# Patient Record
Sex: FEMALE | Race: BLACK OR AFRICAN AMERICAN | Employment: UNEMPLOYED | URBAN - METROPOLITAN AREA
[De-identification: names, ages, dates, MRNs, and addresses within clinical notes are randomized per-mention and may not be internally consistent; named-entity substitution may affect disease eponyms.]

---

## 2018-04-02 ENCOUNTER — HOSPITAL ENCOUNTER (INPATIENT)
Age: 58
LOS: 4 days | Discharge: HOME | DRG: 885 | End: 2018-04-06
Attending: EMERGENCY MEDICINE | Admitting: PSYCHIATRY & NEUROLOGY
Payer: COMMERCIAL

## 2018-04-02 DIAGNOSIS — R45.851 SUICIDAL IDEATION: ICD-10-CM

## 2018-04-02 DIAGNOSIS — F22 PARANOIA (HCC): ICD-10-CM

## 2018-04-02 DIAGNOSIS — R45.850 HOMICIDAL IDEATION: Primary | ICD-10-CM

## 2018-04-02 PROBLEM — F32.9 MAJOR DEPRESSIVE EPISODE: Status: ACTIVE | Noted: 2018-04-02

## 2018-04-02 LAB
ACETAMINOPHEN LEVEL: <15 MCG/ML (ref 10–30)
ALBUMIN SERPL-MCNC: 3.8 G/DL (ref 3.5–5.2)
ALP BLD-CCNC: 65 U/L (ref 35–104)
ALT SERPL-CCNC: 17 U/L (ref 0–32)
AMPHETAMINE SCREEN, URINE: NOT DETECTED
ANION GAP SERPL CALCULATED.3IONS-SCNC: 11 MMOL/L (ref 7–16)
AST SERPL-CCNC: 25 U/L (ref 0–31)
BACTERIA: ABNORMAL /HPF
BARBITURATE SCREEN URINE: NOT DETECTED
BASOPHILS ABSOLUTE: 0.03 E9/L (ref 0–0.2)
BASOPHILS RELATIVE PERCENT: 0.8 % (ref 0–2)
BENZODIAZEPINE SCREEN, URINE: NOT DETECTED
BILIRUB SERPL-MCNC: 0.3 MG/DL (ref 0–1.2)
BILIRUBIN URINE: NEGATIVE
BLOOD, URINE: NEGATIVE
BUN BLDV-MCNC: 6 MG/DL (ref 6–20)
CALCIUM SERPL-MCNC: 9 MG/DL (ref 8.6–10.2)
CANNABINOID SCREEN URINE: POSITIVE
CHLORIDE BLD-SCNC: 105 MMOL/L (ref 98–107)
CLARITY: CLEAR
CO2: 27 MMOL/L (ref 22–29)
COCAINE METABOLITE SCREEN URINE: POSITIVE
COLOR: YELLOW
CREAT SERPL-MCNC: 1 MG/DL (ref 0.5–1)
EKG ATRIAL RATE: 63 BPM
EKG P AXIS: 78 DEGREES
EKG P-R INTERVAL: 148 MS
EKG Q-T INTERVAL: 398 MS
EKG QRS DURATION: 84 MS
EKG QTC CALCULATION (BAZETT): 407 MS
EKG R AXIS: 60 DEGREES
EKG T AXIS: 50 DEGREES
EKG VENTRICULAR RATE: 63 BPM
EOSINOPHILS ABSOLUTE: 0.33 E9/L (ref 0.05–0.5)
EOSINOPHILS RELATIVE PERCENT: 8.3 % (ref 0–6)
EPITHELIAL CELLS, UA: ABNORMAL /HPF
ETHANOL: <10 MG/DL (ref 0–0.08)
GFR AFRICAN AMERICAN: >60
GFR NON-AFRICAN AMERICAN: >60 ML/MIN/1.73
GLUCOSE BLD-MCNC: 135 MG/DL (ref 74–109)
GLUCOSE URINE: NEGATIVE MG/DL
HCT VFR BLD CALC: 34.3 % (ref 34–48)
HEMOGLOBIN: 10.2 G/DL (ref 11.5–15.5)
IMMATURE GRANULOCYTES #: 0.01 E9/L
IMMATURE GRANULOCYTES %: 0.3 % (ref 0–5)
KETONES, URINE: NEGATIVE MG/DL
LEUKOCYTE ESTERASE, URINE: NEGATIVE
LYMPHOCYTES ABSOLUTE: 1.43 E9/L (ref 1.5–4)
LYMPHOCYTES RELATIVE PERCENT: 35.9 % (ref 20–42)
MCH RBC QN AUTO: 28.1 PG (ref 26–35)
MCHC RBC AUTO-ENTMCNC: 29.7 % (ref 32–34.5)
MCV RBC AUTO: 94.5 FL (ref 80–99.9)
METHADONE SCREEN, URINE: NOT DETECTED
MONOCYTES ABSOLUTE: 0.37 E9/L (ref 0.1–0.95)
MONOCYTES RELATIVE PERCENT: 9.3 % (ref 2–12)
NEUTROPHILS ABSOLUTE: 1.81 E9/L (ref 1.8–7.3)
NEUTROPHILS RELATIVE PERCENT: 45.4 % (ref 43–80)
NITRITE, URINE: NEGATIVE
OPIATE SCREEN URINE: NOT DETECTED
PDW BLD-RTO: 13.3 FL (ref 11.5–15)
PH UA: 8.5 (ref 5–9)
PHENCYCLIDINE SCREEN URINE: NOT DETECTED
PLATELET # BLD: 185 E9/L (ref 130–450)
PMV BLD AUTO: 11.4 FL (ref 7–12)
POTASSIUM SERPL-SCNC: 3.9 MMOL/L (ref 3.5–5)
PROPOXYPHENE SCREEN: NOT DETECTED
PROTEIN UA: ABNORMAL MG/DL
RBC # BLD: 3.63 E12/L (ref 3.5–5.5)
RBC UA: ABNORMAL /HPF (ref 0–2)
SALICYLATE, SERUM: <0.3 MG/DL (ref 0–30)
SODIUM BLD-SCNC: 143 MMOL/L (ref 132–146)
SPECIFIC GRAVITY UA: 1.01 (ref 1–1.03)
TOTAL PROTEIN: 6.1 G/DL (ref 6.4–8.3)
TRICYCLIC ANTIDEPRESSANTS SCREEN SERUM: NEGATIVE NG/ML
TSH SERPL DL<=0.05 MIU/L-ACNC: 0.91 UIU/ML (ref 0.27–4.2)
UROBILINOGEN, URINE: 4 E.U./DL
WBC # BLD: 4 E9/L (ref 4.5–11.5)
WBC UA: ABNORMAL /HPF (ref 0–5)

## 2018-04-02 PROCEDURE — 93005 ELECTROCARDIOGRAM TRACING: CPT

## 2018-04-02 PROCEDURE — 99285 EMERGENCY DEPT VISIT HI MDM: CPT

## 2018-04-02 PROCEDURE — G0480 DRUG TEST DEF 1-7 CLASSES: HCPCS

## 2018-04-02 PROCEDURE — 6360000002 HC RX W HCPCS: Performed by: EMERGENCY MEDICINE

## 2018-04-02 PROCEDURE — 84443 ASSAY THYROID STIM HORMONE: CPT

## 2018-04-02 PROCEDURE — 81001 URINALYSIS AUTO W/SCOPE: CPT

## 2018-04-02 PROCEDURE — 2500000003 HC RX 250 WO HCPCS: Performed by: EMERGENCY MEDICINE

## 2018-04-02 PROCEDURE — 80053 COMPREHEN METABOLIC PANEL: CPT

## 2018-04-02 PROCEDURE — 93005 ELECTROCARDIOGRAM TRACING: CPT | Performed by: EMERGENCY MEDICINE

## 2018-04-02 PROCEDURE — 2580000003 HC RX 258

## 2018-04-02 PROCEDURE — 80307 DRUG TEST PRSMV CHEM ANLYZR: CPT

## 2018-04-02 PROCEDURE — 2580000003 HC RX 258: Performed by: EMERGENCY MEDICINE

## 2018-04-02 PROCEDURE — 36415 COLL VENOUS BLD VENIPUNCTURE: CPT

## 2018-04-02 PROCEDURE — 85025 COMPLETE CBC W/AUTO DIFF WBC: CPT

## 2018-04-02 PROCEDURE — 1240000000 HC EMOTIONAL WELLNESS R&B

## 2018-04-02 RX ADMIN — FOLIC ACID: 5 INJECTION, SOLUTION INTRAMUSCULAR; INTRAVENOUS; SUBCUTANEOUS at 16:58

## 2018-04-02 ASSESSMENT — PAIN DESCRIPTION - PAIN TYPE: TYPE: CHRONIC PAIN

## 2018-04-02 ASSESSMENT — PAIN DESCRIPTION - LOCATION: LOCATION: ARM

## 2018-04-02 ASSESSMENT — PAIN DESCRIPTION - ORIENTATION: ORIENTATION: LEFT

## 2018-04-03 ENCOUNTER — APPOINTMENT (OUTPATIENT)
Dept: CT IMAGING | Age: 58
DRG: 885 | End: 2018-04-03
Payer: COMMERCIAL

## 2018-04-03 PROCEDURE — 1240000000 HC EMOTIONAL WELLNESS R&B

## 2018-04-03 PROCEDURE — 99221 1ST HOSP IP/OBS SF/LOW 40: CPT | Performed by: PSYCHIATRY & NEUROLOGY

## 2018-04-03 PROCEDURE — 70450 CT HEAD/BRAIN W/O DYE: CPT

## 2018-04-03 PROCEDURE — 6360000002 HC RX W HCPCS: Performed by: PSYCHIATRY & NEUROLOGY

## 2018-04-03 PROCEDURE — 6370000000 HC RX 637 (ALT 250 FOR IP): Performed by: PSYCHIATRY & NEUROLOGY

## 2018-04-03 RX ORDER — QUETIAPINE FUMARATE 200 MG/1
200 TABLET, FILM COATED ORAL NIGHTLY
Status: DISCONTINUED | OUTPATIENT
Start: 2018-04-03 | End: 2018-04-06 | Stop reason: HOSPADM

## 2018-04-03 RX ORDER — LORAZEPAM 1 MG/1
4 TABLET ORAL
Status: DISCONTINUED | OUTPATIENT
Start: 2018-04-03 | End: 2018-04-06

## 2018-04-03 RX ORDER — SODIUM CHLORIDE 0.9 % (FLUSH) 0.9 %
10 SYRINGE (ML) INJECTION PRN
Status: DISCONTINUED | OUTPATIENT
Start: 2018-04-03 | End: 2018-04-03 | Stop reason: SDUPTHER

## 2018-04-03 RX ORDER — LORAZEPAM 0.5 MG/1
0.5 TABLET ORAL EVERY 6 HOURS PRN
Status: DISCONTINUED | OUTPATIENT
Start: 2018-04-03 | End: 2018-04-03

## 2018-04-03 RX ORDER — SODIUM CHLORIDE 0.9 % (FLUSH) 0.9 %
10 SYRINGE (ML) INJECTION EVERY 12 HOURS SCHEDULED
Status: DISCONTINUED | OUTPATIENT
Start: 2018-04-03 | End: 2018-04-03 | Stop reason: SDUPTHER

## 2018-04-03 RX ORDER — ONDANSETRON 2 MG/ML
4 INJECTION INTRAMUSCULAR; INTRAVENOUS EVERY 6 HOURS PRN
Status: DISCONTINUED | OUTPATIENT
Start: 2018-04-03 | End: 2018-04-03

## 2018-04-03 RX ORDER — TRAZODONE HYDROCHLORIDE 50 MG/1
50 TABLET ORAL NIGHTLY PRN
Status: DISCONTINUED | OUTPATIENT
Start: 2018-04-03 | End: 2018-04-06 | Stop reason: HOSPADM

## 2018-04-03 RX ORDER — LORAZEPAM 2 MG/ML
1 INJECTION INTRAMUSCULAR
Status: DISCONTINUED | OUTPATIENT
Start: 2018-04-03 | End: 2018-04-06

## 2018-04-03 RX ORDER — ONDANSETRON 2 MG/ML
4 INJECTION INTRAMUSCULAR; INTRAVENOUS EVERY 6 HOURS PRN
Status: DISCONTINUED | OUTPATIENT
Start: 2018-04-03 | End: 2018-04-03 | Stop reason: DRUGHIGH

## 2018-04-03 RX ORDER — FLUOXETINE HYDROCHLORIDE 20 MG/1
20 CAPSULE ORAL DAILY
Status: DISCONTINUED | OUTPATIENT
Start: 2018-04-03 | End: 2018-04-06 | Stop reason: HOSPADM

## 2018-04-03 RX ORDER — DOCUSATE SODIUM 100 MG/1
100 CAPSULE, LIQUID FILLED ORAL 2 TIMES DAILY PRN
Status: DISCONTINUED | OUTPATIENT
Start: 2018-04-03 | End: 2018-04-06 | Stop reason: HOSPADM

## 2018-04-03 RX ORDER — LORAZEPAM 1 MG/1
1 TABLET ORAL
Status: DISCONTINUED | OUTPATIENT
Start: 2018-04-03 | End: 2018-04-06

## 2018-04-03 RX ORDER — LORAZEPAM 2 MG/ML
3 INJECTION INTRAMUSCULAR
Status: DISCONTINUED | OUTPATIENT
Start: 2018-04-03 | End: 2018-04-06

## 2018-04-03 RX ORDER — SODIUM CHLORIDE 0.9 % (FLUSH) 0.9 %
10 SYRINGE (ML) INJECTION PRN
Status: DISCONTINUED | OUTPATIENT
Start: 2018-04-03 | End: 2018-04-03

## 2018-04-03 RX ORDER — ONDANSETRON 4 MG/1
4 TABLET, ORALLY DISINTEGRATING ORAL EVERY 8 HOURS PRN
Status: DISCONTINUED | OUTPATIENT
Start: 2018-04-03 | End: 2018-04-06 | Stop reason: HOSPADM

## 2018-04-03 RX ORDER — LORAZEPAM 2 MG/ML
2 INJECTION INTRAMUSCULAR
Status: DISCONTINUED | OUTPATIENT
Start: 2018-04-03 | End: 2018-04-06

## 2018-04-03 RX ORDER — SODIUM CHLORIDE 0.9 % (FLUSH) 0.9 %
10 SYRINGE (ML) INJECTION EVERY 12 HOURS SCHEDULED
Status: DISCONTINUED | OUTPATIENT
Start: 2018-04-03 | End: 2018-04-03

## 2018-04-03 RX ORDER — ACETAMINOPHEN 325 MG/1
650 TABLET ORAL EVERY 4 HOURS PRN
Status: DISCONTINUED | OUTPATIENT
Start: 2018-04-03 | End: 2018-04-06 | Stop reason: HOSPADM

## 2018-04-03 RX ORDER — LORAZEPAM 1 MG/1
2 TABLET ORAL
Status: DISCONTINUED | OUTPATIENT
Start: 2018-04-03 | End: 2018-04-06

## 2018-04-03 RX ORDER — LORAZEPAM 2 MG/ML
4 INJECTION INTRAMUSCULAR
Status: DISCONTINUED | OUTPATIENT
Start: 2018-04-03 | End: 2018-04-06

## 2018-04-03 RX ORDER — LORAZEPAM 1 MG/1
3 TABLET ORAL
Status: DISCONTINUED | OUTPATIENT
Start: 2018-04-03 | End: 2018-04-06

## 2018-04-03 RX ORDER — NICOTINE 21 MG/24HR
1 PATCH, TRANSDERMAL 24 HOURS TRANSDERMAL DAILY
Status: DISCONTINUED | OUTPATIENT
Start: 2018-04-03 | End: 2018-04-06 | Stop reason: HOSPADM

## 2018-04-03 RX ADMIN — QUETIAPINE FUMARATE 200 MG: 100 TABLET ORAL at 21:32

## 2018-04-03 RX ADMIN — FLUOXETINE HYDROCHLORIDE 20 MG: 20 CAPSULE ORAL at 10:00

## 2018-04-03 RX ADMIN — LORAZEPAM 0.5 MG: 0.5 TABLET ORAL at 03:10

## 2018-04-03 RX ADMIN — LORAZEPAM 2 MG: 1 TABLET ORAL at 12:29

## 2018-04-03 RX ADMIN — ACETAMINOPHEN 650 MG: 325 TABLET, FILM COATED ORAL at 03:10

## 2018-04-03 RX ADMIN — LORAZEPAM 1 MG: 1 TABLET ORAL at 21:37

## 2018-04-03 RX ADMIN — ONDANSETRON 4 MG: 4 TABLET, ORALLY DISINTEGRATING ORAL at 10:00

## 2018-04-03 ASSESSMENT — SLEEP AND FATIGUE QUESTIONNAIRES
SLEEP PATTERN: DISTURBED/INTERRUPTED SLEEP;DIFFICULTY FALLING ASLEEP
DIFFICULTY FALLING ASLEEP: YES
DIFFICULTY ARISING: YES
DO YOU HAVE DIFFICULTY SLEEPING: YES
AVERAGE NUMBER OF SLEEP HOURS: 2
RESTFUL SLEEP: NO
DO YOU USE A SLEEP AID: NO
DIFFICULTY STAYING ASLEEP: YES

## 2018-04-03 ASSESSMENT — PAIN - FUNCTIONAL ASSESSMENT: PAIN_FUNCTIONAL_ASSESSMENT: 0-10

## 2018-04-03 ASSESSMENT — PAIN SCALES - GENERAL
PAINLEVEL_OUTOF10: 0
PAINLEVEL_OUTOF10: 0
PAINLEVEL_OUTOF10: 8
PAINLEVEL_OUTOF10: 0

## 2018-04-03 ASSESSMENT — PATIENT HEALTH QUESTIONNAIRE - PHQ9: SUM OF ALL RESPONSES TO PHQ QUESTIONS 1-9: 23

## 2018-04-03 ASSESSMENT — LIFESTYLE VARIABLES: HISTORY_ALCOHOL_USE: YES

## 2018-04-04 PROCEDURE — 1240000000 HC EMOTIONAL WELLNESS R&B

## 2018-04-04 PROCEDURE — 6370000000 HC RX 637 (ALT 250 FOR IP): Performed by: PSYCHIATRY & NEUROLOGY

## 2018-04-04 PROCEDURE — 99231 SBSQ HOSP IP/OBS SF/LOW 25: CPT | Performed by: PSYCHIATRY & NEUROLOGY

## 2018-04-04 RX ADMIN — LORAZEPAM 2 MG: 1 TABLET ORAL at 02:14

## 2018-04-04 RX ADMIN — LORAZEPAM 1 MG: 1 TABLET ORAL at 18:48

## 2018-04-04 RX ADMIN — TRAZODONE HYDROCHLORIDE 50 MG: 50 TABLET ORAL at 21:16

## 2018-04-04 RX ADMIN — LORAZEPAM 1 MG: 1 TABLET ORAL at 05:08

## 2018-04-04 RX ADMIN — QUETIAPINE FUMARATE 200 MG: 100 TABLET ORAL at 21:16

## 2018-04-04 RX ADMIN — FLUOXETINE HYDROCHLORIDE 20 MG: 20 CAPSULE ORAL at 08:38

## 2018-04-04 ASSESSMENT — PAIN SCALES - GENERAL: PAINLEVEL_OUTOF10: 0

## 2018-04-05 ENCOUNTER — APPOINTMENT (OUTPATIENT)
Dept: GENERAL RADIOLOGY | Age: 58
DRG: 885 | End: 2018-04-05
Payer: COMMERCIAL

## 2018-04-05 LAB — CANNABINOIDS CONF, URINE: >500 NG/ML

## 2018-04-05 PROCEDURE — 6370000000 HC RX 637 (ALT 250 FOR IP)

## 2018-04-05 PROCEDURE — 99231 SBSQ HOSP IP/OBS SF/LOW 25: CPT | Performed by: PSYCHIATRY & NEUROLOGY

## 2018-04-05 PROCEDURE — 73600 X-RAY EXAM OF ANKLE: CPT

## 2018-04-05 PROCEDURE — 1240000000 HC EMOTIONAL WELLNESS R&B

## 2018-04-05 PROCEDURE — 6370000000 HC RX 637 (ALT 250 FOR IP): Performed by: PSYCHIATRY & NEUROLOGY

## 2018-04-05 RX ORDER — FLUOXETINE HYDROCHLORIDE 20 MG/1
20 CAPSULE ORAL DAILY
Qty: 30 CAPSULE | Refills: 0 | Status: SHIPPED | OUTPATIENT
Start: 2018-04-06

## 2018-04-05 RX ORDER — IBUPROFEN 400 MG/1
400 TABLET ORAL EVERY 6 HOURS PRN
Status: DISCONTINUED | OUTPATIENT
Start: 2018-04-05 | End: 2018-04-06

## 2018-04-05 RX ORDER — QUETIAPINE FUMARATE 200 MG/1
200 TABLET, FILM COATED ORAL NIGHTLY
Qty: 30 TABLET | Refills: 0 | Status: SHIPPED | OUTPATIENT
Start: 2018-04-05 | End: 2018-05-05

## 2018-04-05 RX ORDER — HALOPERIDOL 5 MG
5 TABLET ORAL EVERY 6 HOURS PRN
Status: DISCONTINUED | OUTPATIENT
Start: 2018-04-05 | End: 2018-04-06

## 2018-04-05 RX ORDER — TRAZODONE HYDROCHLORIDE 50 MG/1
50 TABLET ORAL NIGHTLY PRN
Qty: 30 TABLET | Refills: 0 | Status: SHIPPED | OUTPATIENT
Start: 2018-04-05 | End: 2018-05-05

## 2018-04-05 RX ORDER — HALOPERIDOL 5 MG
TABLET ORAL
Status: COMPLETED
Start: 2018-04-05 | End: 2018-04-05

## 2018-04-05 RX ORDER — HALOPERIDOL 5 MG/ML
5 INJECTION INTRAMUSCULAR EVERY 6 HOURS PRN
Status: DISCONTINUED | OUTPATIENT
Start: 2018-04-05 | End: 2018-04-06

## 2018-04-05 RX ADMIN — LORAZEPAM 1 MG: 1 TABLET ORAL at 04:09

## 2018-04-05 RX ADMIN — Medication 5 MG: at 13:31

## 2018-04-05 RX ADMIN — QUETIAPINE FUMARATE 200 MG: 100 TABLET ORAL at 21:24

## 2018-04-05 RX ADMIN — HALOPERIDOL 5 MG: 5 TABLET ORAL at 13:31

## 2018-04-05 RX ADMIN — FLUOXETINE HYDROCHLORIDE 20 MG: 20 CAPSULE ORAL at 09:01

## 2018-04-05 ASSESSMENT — PAIN SCALES - GENERAL
PAINLEVEL_OUTOF10: 0
PAINLEVEL_OUTOF10: 0

## 2018-04-06 VITALS
RESPIRATION RATE: 18 BRPM | TEMPERATURE: 97.7 F | HEART RATE: 90 BPM | BODY MASS INDEX: 28.16 KG/M2 | SYSTOLIC BLOOD PRESSURE: 149 MMHG | HEIGHT: 65 IN | DIASTOLIC BLOOD PRESSURE: 82 MMHG | OXYGEN SATURATION: 99 % | WEIGHT: 169 LBS

## 2018-04-06 LAB — COCAINE, CONFIRM, URINE: >1000 NG/ML

## 2018-04-06 PROCEDURE — 6370000000 HC RX 637 (ALT 250 FOR IP): Performed by: PSYCHIATRY & NEUROLOGY

## 2018-04-06 PROCEDURE — 99231 SBSQ HOSP IP/OBS SF/LOW 25: CPT | Performed by: PSYCHIATRY & NEUROLOGY

## 2018-04-06 RX ORDER — NICOTINE 21 MG/24HR
1 PATCH, TRANSDERMAL 24 HOURS TRANSDERMAL DAILY
Qty: 30 PATCH | Refills: 0 | Status: SHIPPED | OUTPATIENT
Start: 2018-04-06

## 2018-04-06 RX ADMIN — FLUOXETINE HYDROCHLORIDE 20 MG: 20 CAPSULE ORAL at 09:34

## 2018-04-06 ASSESSMENT — PAIN SCALES - GENERAL: PAINLEVEL_OUTOF10: 0

## 2019-01-01 ENCOUNTER — HOSPITAL ENCOUNTER (EMERGENCY)
Age: 59
End: 2019-09-26
Attending: EMERGENCY MEDICINE
Payer: COMMERCIAL

## 2019-01-01 DIAGNOSIS — I46.9 CARDIOPULMONARY ARREST (HCC): Primary | ICD-10-CM

## 2019-01-01 LAB — METER GLUCOSE: 93 MG/DL (ref 74–99)

## 2019-01-01 PROCEDURE — 2500000003 HC RX 250 WO HCPCS

## 2019-01-01 PROCEDURE — 82962 GLUCOSE BLOOD TEST: CPT

## 2019-01-01 PROCEDURE — 99284 EMERGENCY DEPT VISIT MOD MDM: CPT

## 2019-01-01 PROCEDURE — 92950 HEART/LUNG RESUSCITATION CPR: CPT

## 2019-01-01 PROCEDURE — 6360000002 HC RX W HCPCS

## 2019-01-01 PROCEDURE — 6360000002 HC RX W HCPCS: Performed by: EMERGENCY MEDICINE

## 2019-01-01 RX ADMIN — EPINEPHRINE 1 MG: 0.1 INJECTION, SOLUTION ENDOTRACHEAL; INTRACARDIAC; INTRAVENOUS at 21:21

## 2019-01-01 RX ADMIN — EPINEPHRINE 1 MG: 0.1 INJECTION, SOLUTION ENDOTRACHEAL; INTRACARDIAC; INTRAVENOUS at 21:17

## 2019-09-27 NOTE — ED PROVIDER NOTES
on file. Social History:      Family History: family history is not on file. The patients home medications have been reviewed. Allergies: Patient has no known allergies. Review of Systems:   Pertinent positives and negatives are stated within HPI, all other systems reviewed and are negative. PHYSICAL EXAM:   General: Unresponsive. Head: Atraumatic. Eyes: Fixed and dilated  ENT: Airway patent. Da tube in place. Cardiovascular: Heart sounds are Absent. Pulses are Absent. Respiratory: No spontaneous respirations. Equal breath sounds with controlled ventilation. Abdominal: distended. Musculoskeletal: No deformities. Skin: Cool. Neurological: Unresponsive. Neurological exam limited due to clinical condition.       ------------------------------------------------ RESULTS ---------------------------------------------------    LABS  No results found for this visit on 09/26/19. RADIOLOGY  No orders to display       ---------------------------- NURSING NOTES AND VITALS REVIEWED -------------------------   The nursing notes within the ED encounter and vital signs as below have been reviewed. There were no vitals taken for this visit. Oxygen Saturation Interpretation: Abnormal      ------------------------------------------PROGRESS NOTES -------------------------------------------    ED COURSE MEDICATIONS:                Medications   EPINEPHrine PF 1 MG/10ML injection (1 mg Intravenous Given 9/26/19 2117)   EPINEPHrine PF 1 MG/10ML injection (1 mg Intravenous Given 9/26/19 2121)       Medical Decision Making/ED Course:  Patient presents by EMS as a cardiac arrest.  She was down for an unknown time and her brother reportedly found her down at home. Patient was in asystole for EMS. A Da tube was placed and ACLS was initiated. She received multiple rounds of epinephrine as well as Narcan with no response.   Patient's blood sugar was found to be low by fingerstick blood sugar and patient was given 500 cc of D10 solution. There was no change in her clinical status after these medications were given. Patient has no history of diabetes according to EMS. ACLS was continued and further glucose solutions were given until the patient's blood sugar read 93. There was still no response to any interventions. Bedside ultrasound showed no cardiac activity. Patient had cool extremities and fixed and dilated pupils. Interventions were ceased as there was no response, transient or sustained, and time of death was 9:24 PM    This patient's ED course included: a personal history and physicial examination, re-evaluation prior to disposition, multiple bedside re-evaluations, IV medications, cardiac monitoring, continuous pulse oximetry and complex medical decision making and emergency management    PROCEDURES:           None      CRITICAL CARE:  0    Counseling:   I have spoken with the brother regarding the patient's treatment/condition at this time. --------------------------------------- IMPRESSION & DISPOSITION --------------------------------     IMPRESSION:  1. Cardiopulmonary arrest ([facility] [address]            DISPOSITION:  Disposition: Other Disposition: .      Angie Malin DO  [phone]

## 2019-09-27 NOTE — ED NOTES
[facility] notified.  Spoke with Markell Orr    Reference # [id number]     Pablo Calzada RN  09/26/19 9910